# Patient Record
Sex: MALE | Race: WHITE | NOT HISPANIC OR LATINO | Employment: OTHER | URBAN - METROPOLITAN AREA
[De-identification: names, ages, dates, MRNs, and addresses within clinical notes are randomized per-mention and may not be internally consistent; named-entity substitution may affect disease eponyms.]

---

## 2022-12-20 ENCOUNTER — TELEPHONE (OUTPATIENT)
Dept: PODIATRY | Facility: CLINIC | Age: 47
End: 2022-12-20
Payer: COMMERCIAL

## 2022-12-20 ENCOUNTER — HOSPITAL ENCOUNTER (OUTPATIENT)
Dept: RADIOLOGY | Facility: HOSPITAL | Age: 47
Discharge: HOME | End: 2022-12-20
Attending: PODIATRIST
Payer: COMMERCIAL

## 2022-12-20 ENCOUNTER — OFFICE VISIT (OUTPATIENT)
Dept: PODIATRY | Facility: CLINIC | Age: 47
End: 2022-12-20
Payer: COMMERCIAL

## 2022-12-20 VITALS
TEMPERATURE: 97 F | DIASTOLIC BLOOD PRESSURE: 73 MMHG | BODY MASS INDEX: 25.05 KG/M2 | WEIGHT: 175 LBS | HEIGHT: 70 IN | HEART RATE: 84 BPM | SYSTOLIC BLOOD PRESSURE: 118 MMHG

## 2022-12-20 DIAGNOSIS — M20.21 HALLUX RIGIDUS OF RIGHT FOOT: Primary | ICD-10-CM

## 2022-12-20 DIAGNOSIS — M19.079 OSTEOARTHRITIS OF ANKLE AND FOOT, UNSPECIFIED LATERALITY: ICD-10-CM

## 2022-12-20 DIAGNOSIS — M20.21 HALLUX RIGIDUS OF RIGHT FOOT: ICD-10-CM

## 2022-12-20 PROCEDURE — 73630 XR FOOT COMPLETE 3 VIEW RIGHT: ICD-10-PCS | Mod: 26,RT,, | Performed by: RADIOLOGY

## 2022-12-20 PROCEDURE — 3074F PR MOST RECENT SYSTOLIC BLOOD PRESSURE < 130 MM HG: ICD-10-PCS | Mod: S$GLB,,, | Performed by: PODIATRIST

## 2022-12-20 PROCEDURE — 3078F PR MOST RECENT DIASTOLIC BLOOD PRESSURE < 80 MM HG: ICD-10-PCS | Mod: S$GLB,,, | Performed by: PODIATRIST

## 2022-12-20 PROCEDURE — 73630 X-RAY EXAM OF FOOT: CPT | Mod: 26,RT,, | Performed by: RADIOLOGY

## 2022-12-20 PROCEDURE — 99999 PR PBB SHADOW E&M-NEW PATIENT-LVL IV: ICD-10-PCS | Mod: PBBFAC,,, | Performed by: PODIATRIST

## 2022-12-20 PROCEDURE — 3008F PR BODY MASS INDEX (BMI) DOCUMENTED: ICD-10-PCS | Mod: S$GLB,,, | Performed by: PODIATRIST

## 2022-12-20 PROCEDURE — 1159F MED LIST DOCD IN RCRD: CPT | Mod: S$GLB,,, | Performed by: PODIATRIST

## 2022-12-20 PROCEDURE — 73630 X-RAY EXAM OF FOOT: CPT | Mod: TC,PN,RT

## 2022-12-20 PROCEDURE — 1160F PR REVIEW ALL MEDS BY PRESCRIBER/CLIN PHARMACIST DOCUMENTED: ICD-10-PCS | Mod: S$GLB,,, | Performed by: PODIATRIST

## 2022-12-20 PROCEDURE — 1159F PR MEDICATION LIST DOCUMENTED IN MEDICAL RECORD: ICD-10-PCS | Mod: S$GLB,,, | Performed by: PODIATRIST

## 2022-12-20 PROCEDURE — 3008F BODY MASS INDEX DOCD: CPT | Mod: S$GLB,,, | Performed by: PODIATRIST

## 2022-12-20 PROCEDURE — 3078F DIAST BP <80 MM HG: CPT | Mod: S$GLB,,, | Performed by: PODIATRIST

## 2022-12-20 PROCEDURE — 3074F SYST BP LT 130 MM HG: CPT | Mod: S$GLB,,, | Performed by: PODIATRIST

## 2022-12-20 PROCEDURE — 1160F RVW MEDS BY RX/DR IN RCRD: CPT | Mod: S$GLB,,, | Performed by: PODIATRIST

## 2022-12-20 PROCEDURE — 99999 PR PBB SHADOW E&M-NEW PATIENT-LVL IV: CPT | Mod: PBBFAC,,, | Performed by: PODIATRIST

## 2022-12-20 PROCEDURE — 99203 PR OFFICE/OUTPT VISIT, NEW, LEVL III, 30-44 MIN: ICD-10-PCS | Mod: S$GLB,,, | Performed by: PODIATRIST

## 2022-12-20 PROCEDURE — 99203 OFFICE O/P NEW LOW 30 MIN: CPT | Mod: S$GLB,,, | Performed by: PODIATRIST

## 2022-12-20 RX ORDER — CELECOXIB 200 MG/1
200 CAPSULE ORAL DAILY
Qty: 30 CAPSULE | Refills: 1 | Status: SHIPPED | OUTPATIENT
Start: 2022-12-20 | End: 2023-02-13

## 2022-12-20 RX ORDER — ROSUVASTATIN CALCIUM 5 MG/1
5 TABLET, COATED ORAL
COMMUNITY
Start: 2022-12-13

## 2022-12-20 RX ORDER — TRAZODONE HYDROCHLORIDE 100 MG/1
TABLET ORAL
COMMUNITY
Start: 2022-10-10

## 2022-12-20 RX ORDER — CITALOPRAM 20 MG/1
TABLET, FILM COATED ORAL
COMMUNITY
Start: 2022-12-13

## 2022-12-20 RX ORDER — PREGABALIN 100 MG/1
100 CAPSULE ORAL
COMMUNITY
Start: 2022-12-13 | End: 2023-03-13

## 2022-12-20 NOTE — TELEPHONE ENCOUNTER
Advised patient of the x-ray results. Patient verbalized understanding.    ----- Message from Peyman Bettencourt DPM sent at 12/20/2022  4:30 PM CST -----  Please call the patient and advise I did review his x-ray he does have severe degenerative changes of the big toe joint with spurring both both on the base of the proximal phalanx and the 1st metatarsal.  I will review the patient's x-rays with him at follow-up and discuss surgical options.  ----- Message -----  From: Interface, Rad Results In  Sent: 12/20/2022   4:28 PM CST  To: Peyman Bettencourt DPM

## 2022-12-27 NOTE — PROGRESS NOTES
Subjective:       Patient ID: Chandler Melo is a 47 y.o. male.    Chief Complaint: Foot Injury and Foot Pain  Patient presents today he relates an old injury to the right great toe probably 20 years ago he is very active he does a lot of hiking he states he was recently hiking in [place] about a month ago when it seemed to aggravate the pain and discomfort around the big toe joint on the right foot.  Patient relates no recent trauma or injury to the area other than increased activity.    History reviewed. No pertinent past medical history.  Past Surgical History:   Procedure Laterality Date    HERNIA REPAIR      SPLENECTOMY      THUMB ARTHROSCOPY       Family History   Problem Relation Age of Onset    Stroke Father      Social History     Socioeconomic History    Marital status:    Tobacco Use    Smoking status: Never     Passive exposure: Never    Smokeless tobacco: Never   Substance and Sexual Activity    Alcohol use: Not Currently    Drug use: Never    Sexual activity: Yes       Current Outpatient Medications   Medication Sig Dispense Refill    citalopram (CELEXA) 20 MG tablet   See Instructions, TAKE 1 TABLET BY MOUTH EVERY DAY, # 90 tab, 1 Refill(s), Pharmacy: [facility] #[id number], 180, cm, 12/13/22 14:03:00 CST, Height/Length Measured, 76.2, kg, 12/13/22 14:03:00 CST, Weight Dosing      pregabalin (LYRICA) 100 MG capsule 100 mg.      rosuvastatin (CRESTOR) 5 MG tablet 5 mg.      traZODone (DESYREL) 100 MG tablet   = 1 tab, Oral, HS, # 90 tab, 1 Refill(s), Pharmacy: [facility] #[id number], 180, cm, 10/10/22 10:39:00 CDT, Height/Length Measured, 75.4, kg, 10/10/22 10:39:00 CDT, Weight Dosing      celecoxib (CELEBREX) 200 MG capsule Take 1 capsule (200 mg total) by mouth once daily. 30 capsule 1     No current facility-administered medications for this visit.     Review of patient's allergies indicates:  No Known Allergies    Review of Systems   Musculoskeletal:  Positive for arthralgias and  "joint swelling.   All other systems reviewed and are negative.    Objective:      Vitals:    12/20/22 1431   BP: 118/73   Pulse: 84   Temp: 97.3 °F (36.3 °C)   Weight: 79.4 kg (175 lb)   Height: 5' 10" (1.778 m)     Physical Exam  Vitals and nursing note reviewed.   Constitutional:       Appearance: Normal appearance.   Cardiovascular:      Pulses:           Dorsalis pedis pulses are 2+ on the right side and 2+ on the left side.        Posterior tibial pulses are 2+ on the right side and 2+ on the left side.   Pulmonary:      Effort: Pulmonary effort is normal.   Musculoskeletal:         General: Swelling, tenderness and deformity present.      Right foot: Deformity present.        Feet:    Feet:      Right foot:      Protective Sensation: 2 sites tested.  2 sites sensed.      Skin integrity: Erythema and warmth present.      Left foot:      Protective Sensation: 2 sites tested.  2 sites sensed.   Neurological:      General: No focal deficit present.      Mental Status: He is alert.   Psychiatric:         Mood and Affect: Mood normal.         Behavior: Behavior normal.                Assessment:       1. Hallux rigidus of right foot    2. Osteoarthritis of ankle and foot, unspecified laterality          Plan:       Patient presents today he relates an old injury to the right great toe probably 20 years ago he is very active he does a lot of hiking he states he was recently hiking in [place] about a month ago when it seemed to aggravate the pain and discomfort around the big toe joint on the right foot.  Patient relates no recent trauma or injury to the area other than increased activity.  A comprehensive new patient evaluation was performed today I did advised the patient he does have significant degenerative arthritic changes with arthritic spurring encompassing the 1st MPJ right primarily the dorsal aspect as well as the base of the proximal phalanx has relatively good range of motion at the 1st MPJ right " considering the extensive degenerative changes the degenerative changes are not appreciated on the left this is likely related to the injury to the 1st MPJ right many years ago that has contributed to advancement of degenerative arthritic changes in the joint.  I did discuss briefly with the patient's surgery what typically is done to address this I have ordered plain film x-rays which I will review with the patient at his follow-up appointment in several weeks I did subsequently review the x-rays after the patient's visit does have extensive narrowing and destruction of the 1st MPJ right with extensive spurring noted I will review these with him at follow-up in the meantime I have started the patient on Celebrex 200 mg daily I will see if this give the patient relief with activity and will provide further surgical consultation as necessary at follow-up.This note was created using [employer] voice recognition software that occasionally misinterpreted phrases or words.

## 2023-01-03 ENCOUNTER — OFFICE VISIT (OUTPATIENT)
Dept: PODIATRY | Facility: CLINIC | Age: 48
End: 2023-01-03
Payer: COMMERCIAL

## 2023-01-03 VITALS
OXYGEN SATURATION: 98 % | BODY MASS INDEX: 24.08 KG/M2 | DIASTOLIC BLOOD PRESSURE: 80 MMHG | SYSTOLIC BLOOD PRESSURE: 139 MMHG | HEART RATE: 69 BPM | WEIGHT: 168.19 LBS | HEIGHT: 70 IN

## 2023-01-03 DIAGNOSIS — M20.21 HALLUX RIGIDUS OF RIGHT FOOT: Primary | ICD-10-CM

## 2023-01-03 DIAGNOSIS — M19.071 OSTEOARTHRITIS OF RIGHT ANKLE AND FOOT: ICD-10-CM

## 2023-01-03 PROCEDURE — 99999 PR PBB SHADOW E&M-EST. PATIENT-LVL III: CPT | Mod: PBBFAC,,, | Performed by: PODIATRIST

## 2023-01-03 PROCEDURE — 99999 PR PBB SHADOW E&M-EST. PATIENT-LVL III: ICD-10-PCS | Mod: PBBFAC,,, | Performed by: PODIATRIST

## 2023-01-03 PROCEDURE — 3079F PR MOST RECENT DIASTOLIC BLOOD PRESSURE 80-89 MM HG: ICD-10-PCS | Mod: S$GLB,,, | Performed by: PODIATRIST

## 2023-01-03 PROCEDURE — 1160F RVW MEDS BY RX/DR IN RCRD: CPT | Mod: S$GLB,,, | Performed by: PODIATRIST

## 2023-01-03 PROCEDURE — 1159F MED LIST DOCD IN RCRD: CPT | Mod: S$GLB,,, | Performed by: PODIATRIST

## 2023-01-03 PROCEDURE — 99213 OFFICE O/P EST LOW 20 MIN: CPT | Mod: S$GLB,,, | Performed by: PODIATRIST

## 2023-01-03 PROCEDURE — 3008F PR BODY MASS INDEX (BMI) DOCUMENTED: ICD-10-PCS | Mod: S$GLB,,, | Performed by: PODIATRIST

## 2023-01-03 PROCEDURE — 3075F PR MOST RECENT SYSTOLIC BLOOD PRESS GE 130-139MM HG: ICD-10-PCS | Mod: S$GLB,,, | Performed by: PODIATRIST

## 2023-01-03 PROCEDURE — 3075F SYST BP GE 130 - 139MM HG: CPT | Mod: S$GLB,,, | Performed by: PODIATRIST

## 2023-01-03 PROCEDURE — 1159F PR MEDICATION LIST DOCUMENTED IN MEDICAL RECORD: ICD-10-PCS | Mod: S$GLB,,, | Performed by: PODIATRIST

## 2023-01-03 PROCEDURE — 3079F DIAST BP 80-89 MM HG: CPT | Mod: S$GLB,,, | Performed by: PODIATRIST

## 2023-01-03 PROCEDURE — 3008F BODY MASS INDEX DOCD: CPT | Mod: S$GLB,,, | Performed by: PODIATRIST

## 2023-01-03 PROCEDURE — 1160F PR REVIEW ALL MEDS BY PRESCRIBER/CLIN PHARMACIST DOCUMENTED: ICD-10-PCS | Mod: S$GLB,,, | Performed by: PODIATRIST

## 2023-01-03 PROCEDURE — 99213 PR OFFICE/OUTPT VISIT, EST, LEVL III, 20-29 MIN: ICD-10-PCS | Mod: S$GLB,,, | Performed by: PODIATRIST

## 2023-01-03 RX ORDER — DEXTROAMPHETAMINE SULFATE, DEXTROAMPHETAMINE SACCHARATE, AMPHETAMINE SULFATE AND AMPHETAMINE ASPARTATE 7.5; 7.5; 7.5; 7.5 MG/1; MG/1; MG/1; MG/1
CAPSULE, EXTENDED RELEASE ORAL EVERY MORNING
COMMUNITY
Start: 2022-12-28

## 2023-01-07 NOTE — PROGRESS NOTES
Subjective:       Patient ID: Chandler Melo is a 47 y.o. male.    Chief Complaint: Follow-up  Patient presents today for follow-up of an old injury to the right great toe probably 20 years ago he is very active he does a lot of hiking he states he was recently hiking in [place] about a month ago when it seemed to aggravate the pain and discomfort around the big toe joint on the right foot.  Patient relates no recent trauma or injury to the area other than increased activity.    History reviewed. No pertinent past medical history.  Past Surgical History:   Procedure Laterality Date    HERNIA REPAIR      SPLENECTOMY      THUMB ARTHROSCOPY       Family History   Problem Relation Age of Onset    Stroke Father      Social History     Socioeconomic History    Marital status:    Tobacco Use    Smoking status: Never     Passive exposure: Never    Smokeless tobacco: Never   Substance and Sexual Activity    Alcohol use: Not Currently    Drug use: Never    Sexual activity: Yes       Current Outpatient Medications   Medication Sig Dispense Refill    ADDERALL XR 30 mg 24 hr capsule Take by mouth every morning.      celecoxib (CELEBREX) 200 MG capsule Take 1 capsule (200 mg total) by mouth once daily. 30 capsule 1    citalopram (CELEXA) 20 MG tablet   See Instructions, TAKE 1 TABLET BY MOUTH EVERY DAY, # 90 tab, 1 Refill(s), Pharmacy: [facility] #[id number], 180, cm, 12/13/22 14:03:00 CST, Height/Length Measured, 76.2, kg, 12/13/22 14:03:00 CST, Weight Dosing      pregabalin (LYRICA) 100 MG capsule 100 mg.      rosuvastatin (CRESTOR) 5 MG tablet 5 mg.      traZODone (DESYREL) 100 MG tablet   = 1 tab, Oral, HS, # 90 tab, 1 Refill(s), Pharmacy: [facility] #[id number], 180, cm, 10/10/22 10:39:00 CDT, Height/Length Measured, 75.4, kg, 10/10/22 10:39:00 CDT, Weight Dosing       No current facility-administered medications for this visit.     Review of patient's allergies indicates:  No Known Allergies    Review of  "Systems   Musculoskeletal:  Positive for arthralgias and joint swelling.   All other systems reviewed and are negative.    Objective:      Vitals:    01/03/23 0927   BP: 139/80   Pulse: 69   SpO2: 98%   Weight: 76.3 kg (168 lb 3.2 oz)   Height: 5' 10" (1.778 m)     Physical Exam  Vitals and nursing note reviewed.   Constitutional:       Appearance: Normal appearance.   Cardiovascular:      Pulses:           Dorsalis pedis pulses are 2+ on the right side and 2+ on the left side.        Posterior tibial pulses are 2+ on the right side and 2+ on the left side.   Pulmonary:      Effort: Pulmonary effort is normal.   Musculoskeletal:         General: Swelling, tenderness and deformity present.      Right foot: Deformity present.        Feet:    Feet:      Right foot:      Protective Sensation: 2 sites tested.  2 sites sensed.      Skin integrity: Erythema and warmth present.      Left foot:      Protective Sensation: 2 sites tested.  2 sites sensed.   Neurological:      General: No focal deficit present.      Mental Status: He is alert.   Psychiatric:         Mood and Affect: Mood normal.         Behavior: Behavior normal.                      Assessment:       1. Hallux rigidus of right foot    2. Osteoarthritis of right ankle and foot          Plan:       Patient presents today for follow-up of an old injury to the right great toe probably 20 years ago he is very active he does a lot of hiking he states he was recently hiking in [place] about a month ago when it seemed to aggravate the pain and discomfort around the big toe joint on the right foot.  I previously had ordered x-rays to evaluate the patient's right foot I also started the patient on Celebrex he states the Celebrex is definitely helping a little bit I initially had ordered it once a day but I instructed the patient he can take it twice a day if he needs to he is not taking it every day states he is taking it intermittently as needed.  X-rays reviewed at " length and in detail with the patient patient has extensive destruction of the articular cartilage at the 1st MPJ right there is destruction both of the base of the proximal phalanx and head of the 1st metatarsal the destruction is more noticeable on the base of the proximal phalanx lateral view displays significant spurring at the head of the 1st metatarsal and base of the proximal phalanx I review these at length and in detail with the patient explaining to the patient due to the severity of the degenerative arthritis I am surprised that he has any range of motion at all at the 1st MPJ it is limited and the fact that the patient does not have constant pain is a very good sign.  At this point I have advised the patient I would recommend we monitor this area if he is taking the Celebrex it is helping him should continue to do this I did discuss a steroid injection which can be helpful for 6-12 months in and around the joint to give him relief ultimately this is going to progress I advised the patient at minimum surgically the area can be debrided removing the spurs in and around the joint this will likely by him time and slow down the process but it certainly is not going to stop the process because he is very active.  Patient was made aware the last resort for a dressing this would be a fusion of the 1st MPJ but that would only be if the patient was having constant severe pain with all ambulation.  I did recommend the patient monitor the current situation certainly if this gets worse if it starts to affect activity level we can further evaluate for a steroid injection or even removal of the spurs around the joint which is much more conservative than fusing the joint.  Patient was in understanding and agreement with everything discussed plan follow-up will be as needed.  This note was created using [employer] voice recognition software that occasionally misinterpreted phrases or words.